# Patient Record
Sex: MALE | Race: BLACK OR AFRICAN AMERICAN | NOT HISPANIC OR LATINO | URBAN - METROPOLITAN AREA
[De-identification: names, ages, dates, MRNs, and addresses within clinical notes are randomized per-mention and may not be internally consistent; named-entity substitution may affect disease eponyms.]

---

## 2024-02-21 ENCOUNTER — OFFICE VISIT (OUTPATIENT)
Dept: PRIMARY CARE | Facility: CLINIC | Age: 34
End: 2024-02-21
Payer: COMMERCIAL

## 2024-02-21 VITALS
WEIGHT: 166 LBS | BODY MASS INDEX: 27.66 KG/M2 | HEIGHT: 65 IN | SYSTOLIC BLOOD PRESSURE: 131 MMHG | HEART RATE: 82 BPM | DIASTOLIC BLOOD PRESSURE: 71 MMHG | OXYGEN SATURATION: 98 %

## 2024-02-21 DIAGNOSIS — E55.9 MILD VITAMIN D DEFICIENCY: ICD-10-CM

## 2024-02-21 DIAGNOSIS — Z11.3 ROUTINE SCREENING FOR STI (SEXUALLY TRANSMITTED INFECTION): ICD-10-CM

## 2024-02-21 DIAGNOSIS — Z13.220 LIPID SCREENING: ICD-10-CM

## 2024-02-21 DIAGNOSIS — Z00.00 HEALTH MAINTENANCE EXAMINATION: Primary | ICD-10-CM

## 2024-02-21 DIAGNOSIS — Z72.0 TOBACCO USE: ICD-10-CM

## 2024-02-21 DIAGNOSIS — R00.2 PALPITATIONS: ICD-10-CM

## 2024-02-21 DIAGNOSIS — N46.9 MALE INFERTILITY: ICD-10-CM

## 2024-02-21 PROCEDURE — 99204 OFFICE O/P NEW MOD 45 MIN: CPT | Performed by: STUDENT IN AN ORGANIZED HEALTH CARE EDUCATION/TRAINING PROGRAM

## 2024-02-21 PROCEDURE — 93000 ELECTROCARDIOGRAM COMPLETE: CPT | Performed by: STUDENT IN AN ORGANIZED HEALTH CARE EDUCATION/TRAINING PROGRAM

## 2024-02-21 PROCEDURE — 99385 PREV VISIT NEW AGE 18-39: CPT | Performed by: STUDENT IN AN ORGANIZED HEALTH CARE EDUCATION/TRAINING PROGRAM

## 2024-02-21 NOTE — PATIENT INSTRUCTIONS
Labs in [address] in this building  Ideally fasting and early in the morning  Our lab opens at 7:30AM most days (including Saturdays)    Urology referral with Dr. SREE Wharton) for fertility assessment  I have ordered some of the initial labs to make that visit more productive for you    EKG done today reassuring  HOLTER monitor to better assess over longer timeframe what is going on  Call [phone] to schedule     https://www.cdc.gov/tobacco/features/quitlines/index.html  1-8000-QUIT-NOW  Think about nicotine replacement and/or Wellbutrin to help you     Follow up with me in 3 months!    Best,  Dr. HERNANDEZ

## 2024-02-21 NOTE — PROGRESS NOTES
"Xander Mcdermott is a 33 y.o. male seen in Clinic at [facility] [place], [place] by Dr. Adam Monk on 02/21/24 for routine care, as well as for management of the following chronic medical conditions: tobacco use. Patient is accompanied to this visit by his wife. He presents today for CPE and to establish care.     Acute concerns include palpitations and fertility concerns.     #Palpitations  - more since July 2023  - heavy nicotine use (both black and milds, cigars, cigarettes, and MJ)  - has correlated to increased usage  - no chest pain  - typically while at rest, though also has noted with sexual activity  - no syncope or pre-syncope  - family history of \"palpitations\" in mother and sister, exact details unclear  - no problems personally for patient throughout childhood or young adulthood  - RRR without murmurs on exam in office today   [  ] EKG  [  ] labs  [  ] Holter  - suspect related to significant nicotine use; discussed cessation, he is considering    #Fertility Concerns  - no children  - wife has been evaluated in the past without concern; she has 12 year old son  - having been trying for around 10 years without pregnancy   [  ] labs today: Testosterone, FSH, LH   [  ] urology referral: Dr. BALBUENA    #Tobacco Use  - pre-contemplative regarding quitting  - discussed Wellbutrin, nicotine replacement, considering     Past Medical History: as above   No past medical history on file.  Subspecialty Medical Care: none    Past Surgical History: denies   No past surgical history on file.    Medications: none   No current outpatient medications on file.  Pharmacy: "[facility] ([place])     Allergies: NKDA  No Known Allergies    Immunizations: defers any immunizations today     Family History:   - mother and sister with palpitations   - both grandfather with prostate cancer  - many relatives with T2DM   No family history on file.    Social History:   Home/Living Situation/Falls/Safety Assessment: lives at home with wife, 12 year old " "son, in [place]; feels safe at home   Education/Employment/Work/Vocational:  at restaurant   Activities: some exercise   Drug Use: significant tobacco use; rare, social alcohol use   Diet: \"could be better\"   Depression/Anxiety: denies   Sexuality/Contraception/Menstrual History: , STI screening today   Sleep: no concerns   Healthcare POA/Guardian/Emergency Contact: Wife   Contact Information: correct in EMR    Visit Vitals  /71   Pulse 82   Ht 1.65 m (5' 4.96\")   Wt 75.3 kg (166 lb)   SpO2 98%   BMI 27.66 kg/m²   BSA 1.86 m²      PHYSICAL EXAM:   General: well appearing AA male, NAD, pleasant and engaged in encounter    HEENT: NCAT, MMM  CV: RRR, no m/r/g  PULM: CTAB, non-labored respirations   ABD: soft, NT, ND, + bowel sounds   : no suprapubic tenderness   EXT: WWP, no significant edema   SKIN: no rashes noted   NEURO: A&Ox4, symmetric facies, no gross motor or sensory deficits, normal gait  PSYCH: pleasant mood, appropriate affect     Assessment/Plan    Xander Mcdermott is a 33 y.o. male seen in Clinic at [facility] [place] by Dr. Adam Monk on 02/21/24 for routine care, as well as for management of the following chronic medical conditions: tobacco use. Patient is accompanied to this visit by his wife. He presents today for CPE and to establish care.     Acute concerns include palpitations and fertility concerns.     #Palpitations  - more since July 2023  - heavy nicotine use (both black and milds, cigars, cigarettes, and MJ)  - has correlated to increased usage  - no chest pain  - typically while at rest, though also has noted with sexual activity  - no syncope or pre-syncope  - family history of \"palpitations\" in mother and sister, exact details unclear  - no problems personally for patient throughout childhood or young adulthood  - RRR without murmurs on exam in office today   [  ] EKG  [  ] labs  [  ] Holter  - suspect related to significant nicotine use; discussed cessation, he is " considering    #Fertility Concerns  - no children  - wife has been evaluated in the past without concern; she has 12 year old son  - having been trying for around 10 years without pregnancy   [  ] labs today: Testosterone, FSH, LH   [  ] urology referral: Dr. SREE HorvathTobacco Use  - pre-contemplative regarding quitting  - discussed Wellbutrin, nicotine replacement, considering     #Health Maintenance    Cancer Screening  - Colorectal Cancer Screening: due at 45  - Lung Cancer Screening: due at 50  - Prostate Cancer Screening: consider at 40 given family history     Laboratory Screening  - Lipid Screen: labs today  - A1C, glucose screen: labs today   - STI, HIV, Hep B screen: labs today  - Hep C screen: labs today     Imaging Screening  - AAA screening: due at 65     Immunizations: defers all immunizations   - Influenza: recommended   - COVID: recommended   - Tdap: recommended   - Prevnar, Pneumovax: eligible given smoking status   - Shingrix: due at 50     Other Screening  - Health Literacy Assessment: adequate  - Depression screen: negative   - Home safety/partner violence screen: negative   - Hearing/Vision screens: no concerns   - Alcohol/tobacco/drug use screen: tobacco use  - Healthcare POA/Advanced Directives: wife     Referrals: labs, urology, EKG, holter, tobacco cessation resources   Return to clinic in 3 months for follow-up.    Patient Discussion:    Please call back the office with any questions at [phone]. In the case of an emergency, please call [phone] or go to the nearest Emergency Department.      Adam Monk MD  Internal Medicine-Pediatrics  [facility] [place] [address], [address]  P: [phone], F: [phone]